# Patient Record
Sex: FEMALE | Race: WHITE | Employment: FULL TIME | URBAN - METROPOLITAN AREA
[De-identification: names, ages, dates, MRNs, and addresses within clinical notes are randomized per-mention and may not be internally consistent; named-entity substitution may affect disease eponyms.]

---

## 2017-05-17 ENCOUNTER — TELEPHONE (OUTPATIENT)
Dept: OBGYN CLINIC | Facility: CLINIC | Age: 37
End: 2017-05-17

## 2017-05-18 RX ORDER — VALACYCLOVIR HYDROCHLORIDE 500 MG/1
500 TABLET, FILM COATED ORAL 3 TIMES DAILY
Qty: 9 TABLET | Refills: 2 | Status: SHIPPED | OUTPATIENT
Start: 2017-05-18 | End: 2017-10-23

## 2017-05-18 NOTE — TELEPHONE ENCOUNTER
I spoke with patient on the phone. She is a patient from my previous [address] [place] [address] practice. She has a current herpes outbreak and is treated for recurrent genital herpes. She is ran out of refills for her Valtrex so I emailed a refill in.   I had attempted t

## 2017-10-25 RX ORDER — VALACYCLOVIR HYDROCHLORIDE 500 MG/1
500 TABLET, FILM COATED ORAL 2 TIMES DAILY
Qty: 6 TABLET | Refills: 1 | Status: SHIPPED | OUTPATIENT
Start: 2017-10-25 | End: 2017-10-28

## 2018-08-07 ENCOUNTER — TELEPHONE (OUTPATIENT)
Dept: OBGYN CLINIC | Facility: CLINIC | Age: 38
End: 2018-08-07

## 2018-08-07 RX ORDER — VALACYCLOVIR HYDROCHLORIDE 500 MG/1
500 TABLET, FILM COATED ORAL 2 TIMES DAILY
Qty: 6 TABLET | Refills: 1 | OUTPATIENT
Start: 2018-08-07 | End: 2018-08-10

## 2018-08-07 NOTE — TELEPHONE ENCOUNTER
This is a patient from my previous [facility] practice. I spoke with her in May 2017 at that time she had not been seen in quite a while. I offered her a refill for Valtrex at that time but she was to make a follow-up annual visit and never did so. Our office attempted to contact her again in October to make her appointment we have not heard from her. I am not able to continue to send her refills that she has not establish care in our practice.

## 2018-08-07 NOTE — TELEPHONE ENCOUNTER
Message left on pt's voicemail that Dr Alexandra Ward could not refill a medication for her since she has not has a follow up/annual appointment scheduled with Dr. Alexandra Ward in quite awhile.  Pt can call [phone] with any questions or if she would like to schedule

## 2018-09-05 ENCOUNTER — OFFICE VISIT (OUTPATIENT)
Dept: OBGYN CLINIC | Facility: CLINIC | Age: 38
End: 2018-09-05
Payer: COMMERCIAL

## 2018-09-05 VITALS
DIASTOLIC BLOOD PRESSURE: 72 MMHG | SYSTOLIC BLOOD PRESSURE: 108 MMHG | BODY MASS INDEX: 35.61 KG/M2 | WEIGHT: 235 LBS | HEART RATE: 80 BPM | HEIGHT: 68 IN

## 2018-09-05 DIAGNOSIS — A60.00 RECURRENT GENITAL HSV (HERPES SIMPLEX VIRUS) INFECTION: Primary | ICD-10-CM

## 2018-09-05 PROCEDURE — 99213 OFFICE O/P EST LOW 20 MIN: CPT | Performed by: OBSTETRICS & GYNECOLOGY

## 2018-09-05 RX ORDER — VALACYCLOVIR HYDROCHLORIDE 1 G/1
1 TABLET, FILM COATED ORAL DAILY
Qty: 90 TABLET | Refills: 3 | Status: SHIPPED | OUTPATIENT
Start: 2018-09-05 | End: 2018-10-05

## 2018-09-05 RX ORDER — ZOLPIDEM TARTRATE 10 MG/1
TABLET ORAL
COMMUNITY
Start: 2018-08-23

## 2018-09-05 RX ORDER — VALACYCLOVIR HYDROCHLORIDE 500 MG/1
TABLET, FILM COATED ORAL 2 TIMES DAILY
COMMUNITY

## 2018-09-05 RX ORDER — IBUPROFEN 800 MG/1
800 TABLET ORAL EVERY 6 HOURS PRN
COMMUNITY

## 2018-09-06 NOTE — PROGRESS NOTES
[facility], [facility]  Obstetrics and Gynecology  Focused Gynecology Problem Exam  Miguel Stern MD    Juan Ramon Downey is a 40year old female presenting for Gyn Exam (NP- Herpes outbreak per pt last outbreak 3-5 months ago, per pt took valtrex last night.  P surgical history on file. No family history on file.       Social History  Social History   Marital status:   Spouse name: N/A    Years of education: N/A  Number of children: N/A     Occupational History  None on file     Social History Main Topic tablet 3      Sig: Take 1 tablet (1,000 mg total) by mouth daily.          IMAGING/ REFERRALS:    None     Nicole Case MD  9/5/2018  8:52 PM

## 2020-01-14 ENCOUNTER — TELEPHONE (OUTPATIENT)
Dept: OBGYN CLINIC | Facility: CLINIC | Age: 40
End: 2020-01-14

## 2020-01-14 NOTE — TELEPHONE ENCOUNTER
Pt said IUD is coming up for expiration not sure of date.  JF inserted when he was at [facility] under a different dr group, pt called [facility] office didn't have any of her old records

## 2020-01-17 NOTE — TELEPHONE ENCOUNTER
RN returned call to patient who indicates she had a Mirena IUD placed by Gerson Davila at his prior practice in [facility]. Pt believes the IUD is due for removal soon, but would like to know insertion date.   Pt indicates she attempted to contact the office in [address] [place] [address]

## 2020-01-21 NOTE — TELEPHONE ENCOUNTER
I spoke with patient on the phone today and notified her that I do not have access to her records at [facility].   I gave her the number to [facility] and recommended that she speak with medical records or legal.